# Patient Record
Sex: MALE | Race: BLACK OR AFRICAN AMERICAN | NOT HISPANIC OR LATINO | Employment: UNEMPLOYED | URBAN - METROPOLITAN AREA
[De-identification: names, ages, dates, MRNs, and addresses within clinical notes are randomized per-mention and may not be internally consistent; named-entity substitution may affect disease eponyms.]

---

## 2017-01-07 PROBLEM — I26.99 ACUTE PULMONARY EMBOLISM: Status: ACTIVE | Noted: 2017-01-07

## 2017-01-07 PROBLEM — I82.432 ACUTE DEEP VEIN THROMBOSIS (DVT) OF POPLITEAL VEIN OF LEFT LOWER EXTREMITY: Status: ACTIVE | Noted: 2017-01-07

## 2017-01-08 PROBLEM — I26.99 PULMONARY EMBOLISM: Status: ACTIVE | Noted: 2017-01-08

## 2017-02-08 PROBLEM — Z86.711 PERSONAL HISTORY OF PULMONARY EMBOLISM: Status: ACTIVE | Noted: 2017-01-08

## 2017-02-08 PROBLEM — I26.99 ACUTE PULMONARY EMBOLISM: Status: RESOLVED | Noted: 2017-01-07 | Resolved: 2017-02-08

## 2017-03-10 PROBLEM — G89.29 CHRONIC PAIN: Status: ACTIVE | Noted: 2017-03-10

## 2017-08-08 PROBLEM — K21.9 GASTROESOPHAGEAL REFLUX DISEASE WITHOUT ESOPHAGITIS: Status: ACTIVE | Noted: 2017-08-08

## 2017-08-08 PROBLEM — I82.409 RECURRENT DEEP VEIN THROMBOSIS (DVT): Status: ACTIVE | Noted: 2017-08-08

## 2017-08-23 ENCOUNTER — OFFICE VISIT (OUTPATIENT)
Dept: URGENT CARE | Facility: CLINIC | Age: 27
End: 2017-08-23
Payer: MEDICAID

## 2017-08-23 VITALS
DIASTOLIC BLOOD PRESSURE: 82 MMHG | BODY MASS INDEX: 29.59 KG/M2 | SYSTOLIC BLOOD PRESSURE: 125 MMHG | HEART RATE: 74 BPM | TEMPERATURE: 98 F | HEIGHT: 75 IN | WEIGHT: 238 LBS | OXYGEN SATURATION: 98 % | RESPIRATION RATE: 16 BRPM

## 2017-08-23 DIAGNOSIS — R30.0 DYSURIA: Primary | ICD-10-CM

## 2017-08-23 LAB
BILIRUB UR QL STRIP: NEGATIVE
GLUCOSE UR QL STRIP: NEGATIVE
KETONES UR QL STRIP: NEGATIVE
LEUKOCYTE ESTERASE UR QL STRIP: NEGATIVE
PH, POC UA: 6.5 (ref 5–8)
POC BLOOD, URINE: NEGATIVE
POC NITRATES, URINE: NEGATIVE
PROT UR QL STRIP: NEGATIVE
SP GR UR STRIP: 1.02 (ref 1–1.03)
UROBILINOGEN UR STRIP-ACNC: NORMAL (ref 0.3–2.2)

## 2017-08-23 PROCEDURE — 99203 OFFICE O/P NEW LOW 30 MIN: CPT | Mod: 25,S$GLB,, | Performed by: INTERNAL MEDICINE

## 2017-08-23 PROCEDURE — 81003 URINALYSIS AUTO W/O SCOPE: CPT | Mod: QW,S$GLB,, | Performed by: INTERNAL MEDICINE

## 2017-08-23 PROCEDURE — 3008F BODY MASS INDEX DOCD: CPT | Mod: S$GLB,,, | Performed by: INTERNAL MEDICINE

## 2017-08-23 NOTE — PROGRESS NOTES
"Subjective:       Patient ID: Amado Weaver is a 26 y.o. male.    Vitals:  height is 6' 3" (1.905 m) and weight is 108 kg (238 lb). His oral temperature is 97.5 °F (36.4 °C). His blood pressure is 125/82 and his pulse is 74. His respiration is 16 and oxygen saturation is 98%.     Chief Complaint: Dysuria    Dysuria    This is a recurrent problem. The current episode started acute onset. The problem occurs intermittently. The problem has been unchanged. The pain is at a severity of 0/10. The patient is experiencing no pain. There has been no fever. He is sexually active. There is no history of pyelonephritis. Associated symptoms include frequency and urgency. He has tried nothing for the symptoms. The treatment provided no relief. His past medical history is significant for STD.     Review of Systems   Genitourinary: Positive for dysuria, frequency and urgency.       Objective:      Physical Exam   Constitutional: He is oriented to person, place, and time. He appears well-developed and well-nourished. He is cooperative.  Non-toxic appearance. He does not appear ill. No distress.   HENT:   Head: Normocephalic and atraumatic.   Right Ear: Hearing, tympanic membrane, external ear and ear canal normal.   Left Ear: Hearing, tympanic membrane, external ear and ear canal normal.   Nose: Nose normal. No mucosal edema, rhinorrhea or nasal deformity. No epistaxis. Right sinus exhibits no maxillary sinus tenderness and no frontal sinus tenderness. Left sinus exhibits no maxillary sinus tenderness and no frontal sinus tenderness.   Mouth/Throat: Uvula is midline, oropharynx is clear and moist and mucous membranes are normal. No trismus in the jaw. Normal dentition. No uvula swelling. No posterior oropharyngeal erythema.   Eyes: Conjunctivae and lids are normal. Right eye exhibits no discharge. Left eye exhibits no discharge. No scleral icterus.   Sclera clear bilat   Neck: Trachea normal, normal range of motion, full " passive range of motion without pain and phonation normal. Neck supple.   Cardiovascular: Normal rate, regular rhythm, normal heart sounds, intact distal pulses and normal pulses.    Pulmonary/Chest: Effort normal and breath sounds normal. No respiratory distress.   Abdominal: Soft. Normal appearance and bowel sounds are normal. He exhibits no distension, no pulsatile midline mass and no mass. There is no tenderness.   Genitourinary: Prostate normal and penis normal. No penile tenderness (no testicle tenderness whatsoever no penile discharge no stains on underwear normal cremasteric reflex).   Musculoskeletal: Normal range of motion. He exhibits no edema or deformity.   Neurological: He is alert and oriented to person, place, and time. He exhibits normal muscle tone. Coordination normal.   Skin: Skin is warm, dry and intact. He is not diaphoretic. No pallor.   Psychiatric: He has a normal mood and affect. His speech is normal and behavior is normal. Judgment and thought content normal. Cognition and memory are normal.   Nursing note and vitals reviewed.      Assessment:       1. Dysuria        Plan:         Dysuria  -     POCT Urinalysis, Dipstick, Automated, W/O Scope      Wear condoms

## 2017-08-23 NOTE — PATIENT INSTRUCTIONS
See regular md if symptoms persist   Please return here or go to the Emergency Department for any concerns or worsening of condition.  If you were prescribed antibiotics, please take them to completion.  If you were prescribed a narcotic medication, do not drive or operate heavy equipment or machinery while taking these medications.  Please follow up with your primary care doctor or specialist as needed.    If you  smoke, please stop smoking.

## 2017-08-26 ENCOUNTER — TELEPHONE (OUTPATIENT)
Dept: URGENT CARE | Facility: CLINIC | Age: 27
End: 2017-08-26

## 2017-08-26 ENCOUNTER — OFFICE VISIT (OUTPATIENT)
Dept: URGENT CARE | Facility: CLINIC | Age: 27
End: 2017-08-26
Payer: MEDICAID

## 2017-08-26 VITALS
HEART RATE: 90 BPM | OXYGEN SATURATION: 97 % | SYSTOLIC BLOOD PRESSURE: 140 MMHG | TEMPERATURE: 97 F | WEIGHT: 108 LBS | DIASTOLIC BLOOD PRESSURE: 72 MMHG | HEIGHT: 75 IN | RESPIRATION RATE: 20 BRPM | BODY MASS INDEX: 13.43 KG/M2

## 2017-08-26 DIAGNOSIS — R39.9 URINARY TRACT INFECTION SYMPTOMS: Primary | ICD-10-CM

## 2017-08-26 PROCEDURE — 3008F BODY MASS INDEX DOCD: CPT | Mod: S$GLB,,, | Performed by: FAMILY MEDICINE

## 2017-08-26 PROCEDURE — 99212 OFFICE O/P EST SF 10 MIN: CPT | Mod: S$GLB,,, | Performed by: FAMILY MEDICINE

## 2017-08-26 NOTE — PROGRESS NOTES
"Subjective:       Patient ID: Amado Weaver is a 26 y.o. male.    Vitals:  height is 6' 3" (1.905 m) and weight is 49 kg (108 lb). His oral temperature is 97.1 °F (36.2 °C). His blood pressure is 140/72 (abnormal) and his pulse is 90. His respiration is 20 and oxygen saturation is 97%.     Chief Complaint: Exposure to STD    Exposure to STD   The patient's primary symptoms include genital itching and penile discharge. This is a recurrent problem. The current episode started more than 1 month ago. The problem occurs constantly. The problem has been rapidly worsening. The pain is mild. Pertinent negatives include no chills, dysuria, fever, nausea, rash, urgency or vomiting. The penile discharge was thick. He has tried a heat pack for the symptoms. The treatment provided mild relief. He is sexually active. He never uses condoms. It is possible that his partner has an STD. His past medical history is significant for chlamydia and gonorrhea.     Patient asked for reassurance that he would be treated for STI for sx that have been going on for months before I examined him.  I could not reassure him. He left.      Review of Systems   Constitution: Negative. Negative for chills and fever.   HENT: Negative.    Eyes: Negative.  Negative for discharge.   Cardiovascular: Negative.    Respiratory: Negative.    Endocrine: Negative.    Hematologic/Lymphatic: Negative.    Skin: Negative.  Negative for rash.   Musculoskeletal: Negative.  Negative for back pain.   Gastrointestinal: Negative for nausea and vomiting.   Genitourinary: Positive for discharge. Negative for dysuria, genital sores, hematuria and urgency.   Neurological: Negative.    Psychiatric/Behavioral: Negative.    Allergic/Immunologic: Negative.        Objective:      Physical Exam   Constitutional: He appears well-developed and well-nourished.   Genitourinary:   Genitourinary Comments: Refused exam.   Nursing note and vitals reviewed.      Assessment:       1. " Urinary tract infection symptoms        Plan:         Urinary tract infection symptoms    Patient left prior to evaluation being completed. Did not want an exam.

## 2018-04-05 PROBLEM — E03.8 SUBCLINICAL HYPOTHYROIDISM: Status: ACTIVE | Noted: 2018-04-05

## 2018-08-22 PROBLEM — Z86.711 PERSONAL HISTORY OF PULMONARY EMBOLISM: Status: RESOLVED | Noted: 2017-01-08 | Resolved: 2018-08-22

## 2019-09-11 PROBLEM — N52.2 DRUG-INDUCED ERECTILE DYSFUNCTION: Status: ACTIVE | Noted: 2019-09-11

## 2019-09-11 PROBLEM — Z72.0 TOBACCO ABUSE: Status: ACTIVE | Noted: 2019-09-11

## 2019-09-11 PROBLEM — J30.1 NON-SEASONAL ALLERGIC RHINITIS DUE TO POLLEN: Status: ACTIVE | Noted: 2019-09-11

## 2019-09-11 PROBLEM — R05.3 CHRONIC COUGH: Status: ACTIVE | Noted: 2019-09-11

## 2021-05-06 ENCOUNTER — PATIENT MESSAGE (OUTPATIENT)
Dept: RESEARCH | Facility: HOSPITAL | Age: 31
End: 2021-05-06

## 2021-05-17 PROBLEM — R11.2 NAUSEA AND VOMITING: Status: ACTIVE | Noted: 2021-05-17

## 2021-05-17 PROBLEM — R10.84 GENERALIZED ABDOMINAL PAIN: Status: ACTIVE | Noted: 2021-05-17

## 2021-05-17 PROBLEM — J32.4 CHRONIC PANSINUSITIS: Status: ACTIVE | Noted: 2021-05-17

## 2021-05-17 PROBLEM — R51.9 CHRONIC NONINTRACTABLE HEADACHE: Status: ACTIVE | Noted: 2021-05-17

## 2021-05-17 PROBLEM — G89.29 CHRONIC NONINTRACTABLE HEADACHE: Status: ACTIVE | Noted: 2021-05-17

## 2021-05-17 PROBLEM — J30.9 CHRONIC ALLERGIC RHINITIS: Status: ACTIVE | Noted: 2021-05-17

## 2021-07-01 ENCOUNTER — PATIENT MESSAGE (OUTPATIENT)
Dept: ADMINISTRATIVE | Facility: OTHER | Age: 31
End: 2021-07-01

## 2022-05-17 PROBLEM — Z79.01 CHRONIC ANTICOAGULATION: Status: ACTIVE | Noted: 2022-05-17

## 2022-05-17 PROBLEM — K02.9 DENTAL CARIES: Status: ACTIVE | Noted: 2022-05-17

## 2022-08-05 ENCOUNTER — HOSPITAL ENCOUNTER (OUTPATIENT)
Dept: RADIOLOGY | Facility: HOSPITAL | Age: 32
Discharge: HOME | End: 2022-08-05
Attending: INTERNAL MEDICINE
Payer: MEDICAID

## 2022-08-05 DIAGNOSIS — M79.89 LEG SWELLING: ICD-10-CM

## 2022-08-05 DIAGNOSIS — I82.409 RECURRENT DEEP VEIN THROMBOSIS (DVT): ICD-10-CM

## 2022-08-05 PROCEDURE — 93970 US LOWER EXTREMITY VEINS BILATERAL: ICD-10-PCS | Mod: 26,,, | Performed by: RADIOLOGY

## 2022-08-05 PROCEDURE — 93970 EXTREMITY STUDY: CPT | Mod: TC

## 2022-08-05 PROCEDURE — 93970 EXTREMITY STUDY: CPT | Mod: 26,,, | Performed by: RADIOLOGY

## 2022-11-16 PROBLEM — I82.432 ACUTE DEEP VEIN THROMBOSIS (DVT) OF POPLITEAL VEIN OF LEFT LOWER EXTREMITY: Status: RESOLVED | Noted: 2017-01-07 | Resolved: 2022-11-16

## 2023-12-06 ENCOUNTER — HOSPITAL ENCOUNTER (EMERGENCY)
Facility: HOSPITAL | Age: 33
Discharge: HOME | End: 2023-12-06
Attending: SURGERY
Payer: MEDICAID

## 2023-12-06 VITALS
OXYGEN SATURATION: 96 % | SYSTOLIC BLOOD PRESSURE: 139 MMHG | HEART RATE: 76 BPM | RESPIRATION RATE: 18 BRPM | TEMPERATURE: 99 F | DIASTOLIC BLOOD PRESSURE: 71 MMHG | WEIGHT: 235.81 LBS | BODY MASS INDEX: 28.71 KG/M2 | HEIGHT: 76 IN

## 2023-12-06 DIAGNOSIS — F43.9 STRESS: Primary | ICD-10-CM

## 2023-12-06 DIAGNOSIS — J00 ACUTE NASOPHARYNGITIS: ICD-10-CM

## 2023-12-06 LAB
GROUP A STREP, MOLECULAR: NEGATIVE
INFLUENZA A, MOLECULAR: NEGATIVE
INFLUENZA B, MOLECULAR: NEGATIVE
SARS-COV-2 RDRP RESP QL NAA+PROBE: NEGATIVE
SPECIMEN SOURCE: NORMAL

## 2023-12-06 PROCEDURE — 87502 INFLUENZA DNA AMP PROBE: CPT | Performed by: SURGERY

## 2023-12-06 PROCEDURE — U0002 COVID-19 LAB TEST NON-CDC: HCPCS | Performed by: SURGERY

## 2023-12-06 PROCEDURE — 99283 EMERGENCY DEPT VISIT LOW MDM: CPT

## 2023-12-06 PROCEDURE — 87651 STREP A DNA AMP PROBE: CPT | Performed by: SURGERY

## 2023-12-06 RX ORDER — PROMETHAZINE HYDROCHLORIDE AND DEXTROMETHORPHAN HYDROBROMIDE 6.25; 15 MG/5ML; MG/5ML
5 SYRUP ORAL EVERY 6 HOURS PRN
Qty: 118 ML | Refills: 0 | Status: SHIPPED | OUTPATIENT
Start: 2023-12-06 | End: 2023-12-16

## 2023-12-07 NOTE — ED PROVIDER NOTES
Encounter Date: 12/6/2023       History     Chief Complaint   Patient presents with    URI     Pt to ED with c/o cough and congestion that began x 5 days. Denies fevers at home.      Amado Weaver is a 33 y.o. male that presents with a cough in the emergency room  Patient reports cough & cold symptoms with the influenza exposure on ER evaluation  Patient presents with a significant other requesting codeine cough syrup this evening  No fever, no hypoxia, no signs of distress with a grossly normal physical exam tonight  Patient has been under lot of stress lately, several family members sick with influenza    The history is provided by the patient.     Review of patient's allergies indicates:  No Known Allergies  Past Medical History:   Diagnosis Date    Deep vein thrombosis     Personal history of pulmonary embolism 1/8/2017     Past Surgical History:   Procedure Laterality Date    ANKLE SURGERY       Family History   Problem Relation Age of Onset    No Known Problems Mother      Social History     Tobacco Use    Smoking status: Every Day     Current packs/day: 0.50     Average packs/day: 0.5 packs/day for 10.0 years (5.0 ttl pk-yrs)     Types: Cigarettes    Smokeless tobacco: Never   Substance Use Topics    Alcohol use: No    Drug use: No     Review of Systems   Constitutional:  Negative for activity change, appetite change, fatigue, fever and unexpected weight change.   HENT:  Positive for congestion and sneezing. Negative for ear pain, mouth sores, nosebleeds, rhinorrhea, sinus pressure and sore throat.    Eyes:  Negative for pain, discharge, redness and itching.   Respiratory:  Positive for cough. Negative for apnea, chest tightness and shortness of breath.    Cardiovascular:  Negative for chest pain, palpitations and leg swelling.   Gastrointestinal:  Negative for abdominal distention, abdominal pain, anal bleeding, constipation, diarrhea, nausea and vomiting.   Endocrine: Negative.    Genitourinary:   Negative for dysuria, enuresis, flank pain and frequency.   Musculoskeletal:  Negative for arthralgias, back pain, neck pain and neck stiffness.   Skin:  Negative for color change and wound.   Allergic/Immunologic: Negative.    Neurological:  Negative for dizziness, tremors, syncope, facial asymmetry, speech difficulty, weakness, light-headedness, numbness and headaches.   Hematological:  Negative for adenopathy. Does not bruise/bleed easily.   Psychiatric/Behavioral:  Negative for agitation, behavioral problems, hallucinations, self-injury and suicidal ideas. The patient is not nervous/anxious.        Physical Exam     Initial Vitals [12/06/23 2154]   BP Pulse Resp Temp SpO2   139/71 76 18 98.5 °F (36.9 °C) 96 %      MAP       --         Physical Exam    Nursing note and vitals reviewed.  Constitutional: Vital signs are normal. He appears well-developed and well-nourished. He is cooperative.   HENT:   Head: Normocephalic and atraumatic.   Mouth/Throat: Uvula is midline and mucous membranes are normal.   Eyes: Conjunctivae, EOM and lids are normal. Pupils are equal, round, and reactive to light.   Neck: Neck supple.   Normal range of motion.   Full passive range of motion without pain.     Cardiovascular:  Normal rate, regular rhythm, S1 normal, S2 normal, normal heart sounds, intact distal pulses and normal pulses.           Pulmonary/Chest: Effort normal and breath sounds normal.   Abdominal: Abdomen is soft and flat. Bowel sounds are normal.   Musculoskeletal:         General: Normal range of motion.      Cervical back: Full passive range of motion without pain, normal range of motion and neck supple.     Neurological: He is alert and oriented to person, place, and time. He has normal strength.   Skin: Skin is warm, dry and intact. Capillary refill takes less than 2 seconds.         ED Course   Procedures  Labs Reviewed   INFLUENZA A & B BY MOLECULAR   GROUP A STREP, MOLECULAR   SARS-COV-2 RNA AMPLIFICATION, QUAL           Imaging Results    None          Medications - No data to display    Medical Decision Making  Stressed out 33-year-old with recent exposure to influenza on ER evaluation today  Differential: flu, strep, COVID, bronchitis, pneumonia, URI, virus, otitis media    Problems Addressed:  Acute nasopharyngitis: complicated acute illness or injury  Stress: complicated acute illness or injury    Amount and/or Complexity of Data Reviewed  Labs: ordered. Decision-making details documented in ED Course.    ED Management & Risks of Complication, Morbidity, & Mortality:  (-) swabs in the ER, I will not prescribe any narcotic cough syrup  Phenergan DM as prescribed with PCP follow-up in next 48 hours    Clinical Impression:  Final diagnoses:  [J00] Acute nasopharyngitis  [F43.9] Stress (Primary)          ED Disposition Condition    Discharge Stable          ED Prescriptions       Medication Sig Dispense Start Date End Date Auth. Provider    promethazine-dextromethorphan (PROMETHAZINE-DM) 6.25-15 mg/5 mL Syrp Take 5 mLs by mouth every 6 (six) hours as needed (cough). 118 mL 12/6/2023 12/16/2023 Ramana Fine MD          Follow-up Information       Follow up With Specialties Details Why Contact Info    Delfino Rivera MD Internal Medicine, Critical Care Medicine Schedule an appointment as soon as possible for a visit in 2 days  [address] [facility] [address]  [phone]               Ramana Fine MD  12/06/23 9839

## 2024-01-28 ENCOUNTER — HOSPITAL ENCOUNTER (EMERGENCY)
Facility: HOSPITAL | Age: 34
Discharge: HOME | End: 2024-01-28
Attending: EMERGENCY MEDICINE
Payer: MEDICAID

## 2024-01-28 VITALS
DIASTOLIC BLOOD PRESSURE: 81 MMHG | OXYGEN SATURATION: 96 % | TEMPERATURE: 98 F | SYSTOLIC BLOOD PRESSURE: 129 MMHG | HEART RATE: 67 BPM | WEIGHT: 238 LBS | BODY MASS INDEX: 28.97 KG/M2 | RESPIRATION RATE: 16 BRPM

## 2024-01-28 DIAGNOSIS — S16.1XXA STRAIN OF NECK MUSCLE, INITIAL ENCOUNTER: Primary | ICD-10-CM

## 2024-01-28 PROCEDURE — 99284 EMERGENCY DEPT VISIT MOD MDM: CPT

## 2024-01-28 RX ORDER — NAPROXEN 500 MG/1
500 TABLET ORAL 2 TIMES DAILY
Qty: 20 TABLET | Refills: 0 | Status: SHIPPED | OUTPATIENT
Start: 2024-01-28 | End: 2024-05-22

## 2024-01-28 RX ORDER — CYCLOBENZAPRINE HCL 10 MG
10 TABLET ORAL 3 TIMES DAILY PRN
Qty: 12 TABLET | Refills: 0 | Status: SHIPPED | OUTPATIENT
Start: 2024-01-28 | End: 2024-02-07

## 2024-01-29 NOTE — ED PROVIDER NOTES
Encounter Date: 1/28/2024       History     Chief Complaint   Patient presents with    Neck Pain     R neck pain onset Monday - woke up with neck pain. Tuesday he helped lift his dad and has been having pain ever since. R sided neck pain worse with movement, resolves when at rest. ROM intact.      30-year-old male with complaint of pain on the right side of the neck for about a week.  Patient says he woke up in the morning with pain and felt he slept wrong.  Patient denies any other injuries.  Patient denies any focal weakness, numbness, or tingling.    Patient denies any fever or chills.        Review of patient's allergies indicates:  No Known Allergies  Past Medical History:   Diagnosis Date    Deep vein thrombosis     Personal history of pulmonary embolism 1/8/2017     Past Surgical History:   Procedure Laterality Date    ANKLE SURGERY       Family History   Problem Relation Age of Onset    No Known Problems Mother      Social History     Tobacco Use    Smoking status: Every Day     Current packs/day: 0.50     Average packs/day: 0.5 packs/day for 10.0 years (5.0 ttl pk-yrs)     Types: Cigarettes    Smokeless tobacco: Never   Substance Use Topics    Alcohol use: No    Drug use: No     Review of Systems   Constitutional: Negative.    HENT: Negative.     Eyes: Negative.    Respiratory: Negative.     Cardiovascular: Negative.    Gastrointestinal: Negative.    Endocrine: Negative.    Genitourinary: Negative.    Musculoskeletal:  Positive for neck pain.   Skin: Negative.    Neurological: Negative.    Hematological: Negative.        Physical Exam     Initial Vitals [01/28/24 2051]   BP Pulse Resp Temp SpO2   129/81 67 16 97.8 °F (36.6 °C) 96 %      MAP       --         Physical Exam    Nursing note and vitals reviewed.  Constitutional: He appears well-developed.   HENT:   Head: Normocephalic and atraumatic.   Eyes: EOM are normal. Pupils are equal, round, and reactive to light.   Neck:   Mild tenderness on the right  side of the C-spine, no midline tenderness, no step-off, full range of motion.     Neurological: He is alert and oriented to person, place, and time.         ED Course   Procedures  Labs Reviewed - No data to display       Imaging Results              X-Ray Cervical Spine AP And Lateral (Final result)  Result time 01/28/24 22:21:51      Final result by Kyle Cesar MD (01/28/24 22:21:51)                   Impression:      No acute radiographic abnormality.      Electronically signed by: Kyle Cesar  Date:    01/28/2024  Time:    22:21               Narrative:    EXAMINATION:  XR CERVICAL SPINE AP LATERAL    CLINICAL HISTORY:  neck pain;    TECHNIQUE:  AP, lateral and open mouth views of the cervical spine were performed.    COMPARISON:  07/14/2022    FINDINGS:  Alignment is satisfactory no acute fracture, subluxation or dislocation.  Soft tissues are within normal limits the posterior elements are intact.  Disc spaces are well maintained.                                       Medications - No data to display  Medical Decision Making  Amount and/or Complexity of Data Reviewed  Radiology: ordered.                                      Clinical Impression:  Final diagnoses:  [S16.1XXA] Strain of neck muscle, initial encounter (Primary)          ED Disposition Condition    Discharge Stable          ED Prescriptions       Medication Sig Dispense Start Date End Date Auth. Provider    naproxen (NAPROSYN) 500 MG tablet Take 1 tablet (500 mg total) by mouth 2 (two) times daily. 20 tablet 1/28/2024 -- Anila García MD    cyclobenzaprine (FLEXERIL) 10 MG tablet Take 1 tablet (10 mg total) by mouth 3 (three) times daily as needed for Muscle spasms. 12 tablet 1/28/2024 2/7/2024 Anila García MD          Follow-up Information       Follow up With Specialties Details Why Contact Info    Delfino Rivera MD Internal Medicine, Critical Care Medicine In 2 days  [address] [facility] [address] [phone]                Anila García MD  01/28/24 2442

## 2024-05-22 PROBLEM — G89.29 CHRONIC RIGHT SHOULDER PAIN: Status: ACTIVE | Noted: 2024-05-22

## 2024-05-22 PROBLEM — M25.511 CHRONIC RIGHT SHOULDER PAIN: Status: ACTIVE | Noted: 2024-05-22

## 2025-01-02 ENCOUNTER — HOSPITAL ENCOUNTER (OUTPATIENT)
Dept: RADIOLOGY | Facility: HOSPITAL | Age: 35
Discharge: HOME | End: 2025-01-02
Attending: INTERNAL MEDICINE
Payer: MEDICAID

## 2025-01-02 DIAGNOSIS — M25.512 ACUTE PAIN OF LEFT SHOULDER: ICD-10-CM

## 2025-01-02 DIAGNOSIS — R22.32 MASS OF SKIN OF LEFT SHOULDER: ICD-10-CM

## 2025-01-02 PROCEDURE — 76881 US COMPL JOINT R-T W/IMG: CPT | Mod: 26,LT,, | Performed by: RADIOLOGY

## 2025-01-02 PROCEDURE — 73030 X-RAY EXAM OF SHOULDER: CPT | Mod: TC,LT

## 2025-01-02 PROCEDURE — 76881 US COMPL JOINT R-T W/IMG: CPT | Mod: TC,LT

## 2025-01-02 PROCEDURE — 73030 X-RAY EXAM OF SHOULDER: CPT | Mod: 26,LT,, | Performed by: RADIOLOGY
